# Patient Record
Sex: MALE | Race: WHITE | Employment: FULL TIME | URBAN - METROPOLITAN AREA
[De-identification: names, ages, dates, MRNs, and addresses within clinical notes are randomized per-mention and may not be internally consistent; named-entity substitution may affect disease eponyms.]

---

## 2017-01-22 ENCOUNTER — OFFICE VISIT (OUTPATIENT)
Dept: URGENT CARE | Facility: URGENT CARE | Age: 37
End: 2017-01-22
Payer: COMMERCIAL

## 2017-01-22 VITALS
RESPIRATION RATE: 18 BRPM | DIASTOLIC BLOOD PRESSURE: 78 MMHG | TEMPERATURE: 98.6 F | SYSTOLIC BLOOD PRESSURE: 124 MMHG | HEIGHT: 72 IN | OXYGEN SATURATION: 99 % | HEART RATE: 87 BPM | BODY MASS INDEX: 31.15 KG/M2 | WEIGHT: 230 LBS

## 2017-01-22 DIAGNOSIS — M79.651 PAIN OF RIGHT THIGH: Primary | ICD-10-CM

## 2017-01-22 DIAGNOSIS — R19.7 DIARRHEA, UNSPECIFIED TYPE: ICD-10-CM

## 2017-01-22 PROCEDURE — 99213 OFFICE O/P EST LOW 20 MIN: CPT | Performed by: FAMILY MEDICINE

## 2017-01-22 NOTE — PROGRESS NOTES
SUBJECTIVE:                                                    Beltran Stallings is a 36 year old male who presents to clinic today for the following health issues:      Pain from right knee to abdomen, also pressure in lower right abdomen. Started three months ago pain comes and goes.         Problem list and histories reviewed & adjusted, as indicated.  Additional history:     Patient Active Problem List   Diagnosis     Acute esophagitis     Headache     Anxiety     CARDIOVASCULAR SCREENING; LDL GOAL LESS THAN 160     TMJ (temporomandibular joint syndrome)     Seasonal allergic rhinitis     History reviewed. No pertinent past surgical history.    Social History   Substance Use Topics     Smoking status: Never Smoker      Smokeless tobacco: Current User     Types: Chew      Comment: close to 1 tin ppd     Alcohol Use: No     Family History   Problem Relation Age of Onset     Depression Father      Depression Mother      Hypertension Mother      Lipids Mother      Depression Sister      Depression Sister            ROS:  Constitutional, HEENT, cardiovascular, pulmonary, gi and gu systems are negative, except as otherwise noted.    OBJECTIVE:                                                    /78 mmHg  Pulse 87  Temp(Src) 98.6  F (37  C) (Oral)  Resp 18  Ht 6' (1.829 m)  Wt 230 lb (104.327 kg)  BMI 31.19 kg/m2  SpO2 99%  Body mass index is 31.19 kg/(m^2).  GENERAL: healthy, alert and no distress  NECK: no adenopathy, no asymmetry, masses, or scars and thyroid normal to palpation  RESP: lungs clear to auscultation - no rales, rhonchi or wheezes  CV: regular rate and rhythm, normal S1 S2, no S3 or S4, no murmur, click or rub, no peripheral edema and peripheral pulses strong  ABDOMEN: soft, nontender, without hepatosplenomegaly or masses and bowel sounds normal  MS: Complains of pain in the right groin with  External rotation of the leg.  PSYCH: mentation appears normal and affect normal/bright          ASSESSMENT/PLAN:                                                              ICD-10-CM    1. Pain of right thigh M79.651    2. Diarrhea, unspecified type R19.7      3. Anxiety; he has a history of anxiety and this may be contributing to his episodes of some diarrhea. This may be irritable bowel. Hyperacidity the stomach is also a possibility.    As far as his leg is concern it seems to me as he may have a pole of the muscle or tendon in that leg high up into the groin. These can be notoriously hard to diagnose and to treat. He is a very athletic young man and goes to the gym on a daily basis    Omeprazole 40 mg once a day for 12 weeks  Sports medicine referral    Follow-up with Dr. Perales his primary care within the next month      Tenzin Peres MD  [facility]

## 2017-01-22 NOTE — NURSING NOTE
Chief Complaint   Patient presents with     Urgent Care     Hernia       Initial /78 mmHg  Pulse 87  Temp(Src) 98.6  F (37  C) (Oral)  Resp 18  Ht 6' (1.829 m)  Wt 230 lb (104.327 kg)  BMI 31.19 kg/m2  SpO2 99% Estimated body mass index is 31.19 kg/(m^2) as calculated from the following:    Height as of this encounter: 6' (1.829 m).    Weight as of this encounter: 230 lb (104.327 kg).  BP completed using cuff size: regular    Dahiana Tinsley  CMA

## 2019-10-01 ENCOUNTER — HEALTH MAINTENANCE LETTER (OUTPATIENT)
Age: 39
End: 2019-10-01

## 2021-01-15 ENCOUNTER — HEALTH MAINTENANCE LETTER (OUTPATIENT)
Age: 41
End: 2021-01-15

## 2021-09-04 ENCOUNTER — HEALTH MAINTENANCE LETTER (OUTPATIENT)
Age: 41
End: 2021-09-04

## 2022-02-19 ENCOUNTER — HEALTH MAINTENANCE LETTER (OUTPATIENT)
Age: 42
End: 2022-02-19

## 2022-10-22 ENCOUNTER — HEALTH MAINTENANCE LETTER (OUTPATIENT)
Age: 42
End: 2022-10-22

## 2023-04-01 ENCOUNTER — HEALTH MAINTENANCE LETTER (OUTPATIENT)
Age: 43
End: 2023-04-01